# Patient Record
Sex: FEMALE | Race: WHITE
[De-identification: names, ages, dates, MRNs, and addresses within clinical notes are randomized per-mention and may not be internally consistent; named-entity substitution may affect disease eponyms.]

---

## 2020-09-02 ENCOUNTER — HOSPITAL ENCOUNTER (EMERGENCY)
Dept: HOSPITAL 41 - JD.ED | Age: 45
Discharge: HOME | End: 2020-09-02
Payer: COMMERCIAL

## 2020-09-02 VITALS — DIASTOLIC BLOOD PRESSURE: 95 MMHG | HEART RATE: 90 BPM | SYSTOLIC BLOOD PRESSURE: 162 MMHG

## 2020-09-02 DIAGNOSIS — Z79.899: ICD-10-CM

## 2020-09-02 DIAGNOSIS — F80.81: Primary | ICD-10-CM

## 2020-09-02 DIAGNOSIS — Z79.82: ICD-10-CM

## 2020-09-02 PROCEDURE — 36415 COLL VENOUS BLD VENIPUNCTURE: CPT

## 2020-09-02 PROCEDURE — 83735 ASSAY OF MAGNESIUM: CPT

## 2020-09-02 PROCEDURE — 85025 COMPLETE CBC W/AUTO DIFF WBC: CPT

## 2020-09-02 PROCEDURE — 96374 THER/PROPH/DIAG INJ IV PUSH: CPT

## 2020-09-02 PROCEDURE — 80053 COMPREHEN METABOLIC PANEL: CPT

## 2020-09-02 PROCEDURE — 99285 EMERGENCY DEPT VISIT HI MDM: CPT

## 2020-09-02 PROCEDURE — 70450 CT HEAD/BRAIN W/O DYE: CPT

## 2020-09-02 NOTE — CT
Head CT

 

Technique: Multiple axial sections through the brain were obtained.  

Intravenous contrast was not utilized.

 

Comparison: No prior intracranial imaging is available.

 

Findings: Ventricles along with basal cisterns and sulci over the 

convexities are within normal limits for the patient's age.  No 

abnormal parenchymal densities are seen.  No evidence of intracranial 

hemorrhage.  No midline shift or mass effect is seen.

 

Bone window settings were reviewed.  No acute calvarial finding is 

appreciated.  Visualized mastoid sinuses and visualized paranasal 

sinuses show nothing acute.

 

Impression:

1.  Nothing acute is appreciated on noncontrast head CT exam.

 

Diagnostic code #1

 

Study was dictated in MDT

## 2020-09-02 NOTE — EDM.PDOC
ED HPI GENERAL MEDICAL PROBLEM





- General


Chief Complaint: General


Stated Complaint: STUTTERING WORDS/ POSSIBLE FROM SURGERY ON MONDAY


Time Seen by Provider: 09/02/20 14:57





- History of Present Illness


INITIAL COMMENTS - FREE TEXT/NARRATIVE: 





45-year-old female presents the emergency room with stuttering.





This started yesterday.  On Monday the patient had a 3 level cervical fusion at 

C4-5 C5-6 and C 6-7.  She is wondering if there is a relationship.  Patient 

denies any fevers or chills she did have a little bit of a hoarse voice 

following the surgery which is to be expected.  Patient denies any other 

problems with this most unfortunate development.  Patient is a little anxious.


  ** Neck


Pain Score (Numeric/FACES): 10





- Related Data


                                    Allergies











Allergy/AdvReac Type Severity Reaction Status Date / Time


 


No Known Allergies Allergy   Verified 09/02/20 15:01











Home Meds: 


                                    Home Meds





ALPRAZolam [Alprazolam] 0.5 mg PO DAILY 06/26/15 [History]


Losartan [Cozaar] 50 mg PO DAILY 06/26/15 [History]


Venlafaxine HCl [Venlafaxine ER] 75 mg PO DAILY 06/26/15 [History]


Zolpidem [Ambien] 10 mg PO BEDTIME PRN 06/26/15 [History]


traZODone HCl [Trazodone HCl] 50 mg PO BEDTIME PRN 06/26/15 [History]


Acetaminophen/HYDROcodone [Norco 325-5 MG] 1 tab PO Q4H PRN #40 tablet 06/29/15 

[Rx]


Aspirin 325 mg PO DAILY #30 tablet 06/29/15 [Rx]











Past Medical History


Other OB/GYN History: HX OF PREGNANCY X4, 1 MISSED JUSTIN, 3 VAGINAL BIRTHS


Other Musculoskeletal History: RIGHT KNEE PAIN


Other Dermatologic History: BREAST AUGMENTATION





- Past Surgical History


Other HEENT Surgeries/Procedures: HX OF SINUS SURGERY AND OTOPLASTY





ED ROS GENERAL





- Review of Systems


Review Of Systems: See Below


Constitutional: Reports: No Symptoms


HEENT: Reports: Other (This change and stuttering)


Respiratory: Reports: No Symptoms


Cardiovascular: Reports: No Symptoms


Endocrine: Reports: No Symptoms


GI/Abdominal: Reports: No Symptoms


: Reports: No Symptoms


Musculoskeletal: Reports: Neck Pain (Chronic)


Skin: Reports: No Symptoms


Neurological: Reports: No Symptoms


Psychiatric: Reports: Anxiety.  Denies: No Symptoms


Hematologic/Lymphatic: Reports: No Symptoms


Immunologic: Reports: No Symptoms





ED EXAM, GENERAL





- Physical Exam


Exam: See Below


Exam Limited By: No Limitations


General Appearance: Alert, Anxious


Eye Exam: Bilateral Eye: Normal Inspection


Ears: Normal External Exam, Normal Canal, Hearing Grossly Normal, Normal TMs


Nose: Normal Inspection, Normal Mucosa, No Blood


Throat/Mouth: Normal Inspection, Normal Lips, Normal Gums, Normal Oropharynx, No

 Airway Compromise


Head: Atraumatic, Normocephalic


Neck: Other (Anterior approach for neck surgery incision looks good)


Respiratory/Chest: No Respiratory Distress, Lungs Clear, Normal Breath Sounds


Cardiovascular: Regular Rate, Rhythm, No Edema, No Murmur


GI/Abdominal: Normal Bowel Sounds, Soft, Non-Tender


Back Exam: Normal Inspection.  No: CVA Tenderness (L), CVA Tenderness (R)


Extremities: Normal Inspection, Normal Range of Motion





Course





- Vital Signs


Last Recorded V/S: 


                                Last Vital Signs











Temp  35.9 C L  09/02/20 14:57


 


Pulse  90   09/02/20 14:57


 


Resp  18   09/02/20 14:57


 


BP  162/95 H  09/02/20 14:57


 


Pulse Ox  98   09/02/20 14:57














- Orders/Labs/Meds


Labs: 


                                Laboratory Tests











  09/02/20 09/02/20 Range/Units





  15:40 15:40 


 


WBC  8.26   (3.98-10.04)  K/mm3


 


RBC  3.92 L   (3.98-5.22)  M/mm3


 


Hgb  12.8  D   (11.2-15.7)  gm/dl


 


Hct  39.0   (34.1-44.9)  %


 


MCV  99.5 H   (79.4-94.8)  fl


 


MCH  32.7 H   (25.6-32.2)  pg


 


MCHC  32.8   (32.2-35.5)  g/dl


 


RDW Std Deviation  48.4 H   (36.4-46.3)  fL


 


Plt Count  270  D   (182-369)  K/mm3


 


MPV  8.5 L   (9.4-12.3)  fl


 


Neut % (Auto)  78.0 H   (34.0-71.1)  %


 


Lymph % (Auto)  16.2 L   (19.3-51.7)  %


 


Mono % (Auto)  4.5 L   (4.7-12.5)  %


 


Eos % (Auto)  1.0   (0.7-5.8)  


 


Baso % (Auto)  0.1   (0.1-1.2)  %


 


Neut # (Auto)  6.44 H   (1.56-6.13)  K/mm3


 


Lymph # (Auto)  1.34   (1.18-3.74)  K/mm3


 


Mono # (Auto)  0.37 H   (0.24-0.36)  K/mm3


 


Eos # (Auto)  0.08   (0.04-0.36)  K/mm3


 


Baso # (Auto)  0.01   (0.01-0.08)  K/mm3


 


Sodium   137  (136-145)  mEq/L


 


Potassium   3.7  (3.5-5.1)  mEq/L


 


Chloride   100  ()  mEq/L


 


Carbon Dioxide   29  (21-32)  mEq/L


 


Anion Gap   11.7  (5-15)  


 


BUN   7  (7-18)  mg/dL


 


Creatinine   0.8  (0.55-1.02)  mg/dL


 


Est Cr Clr Drug Dosing   75.07  mL/min


 


Estimated GFR (MDRD)   > 60  (>60)  mL/min


 


BUN/Creatinine Ratio   8.8 L  (14-18)  


 


Glucose   105  ()  mg/dL


 


Calcium   9.6  (8.5-10.1)  mg/dL


 


Magnesium   1.8  (1.8-2.4)  mg/dl


 


Total Bilirubin   0.3  (0.2-1.0)  mg/dL


 


AST   18  (15-37)  U/L


 


ALT   14  (14-59)  U/L


 


Alkaline Phosphatase   53  ()  U/L


 


Total Protein   6.8  (6.4-8.2)  g/dl


 


Albumin   3.4  (3.4-5.0)  g/dl


 


Globulin   3.4  gm/dL


 


Albumin/Globulin Ratio   1.0  (1-2)  











Meds: 


Medications














Discontinued Medications














Generic Name Dose Route Start Last Admin





  Trade Name Freq  PRN Reason Stop Dose Admin


 


Lorazepam  1 mg  09/02/20 15:31  09/02/20 15:37





  Ativan  IVPUSH  09/02/20 15:32  1 mg





  ONETIME ONE   Administration














- Re-Assessments/Exams


Free Text/Narrative Re-Assessment/Exam: 





09/02/20 16:02


Stuttering is a central response not a peripheral response.  I did discuss the 

patient's case with Dr. Altman, the patient's surgeon,  who would like the 

patient to have a head CT and make sure electrolytes are okay which is 

reasonable.  If this is unrevealing we could schedule an outpatient MRI


09/02/20 16:34


Head CT is unremarkable labs as stated above unremarkable.  We will get the 

patient scheduled for an outpatient MRI with results to Dr. Altman and Dr. Chavez and interestingly patient speech seems to have improved after little bit 

of Ativan.





Departure





- Departure


Time of Disposition: 16:36


Disposition: Home, Self-Care 01


Clinical Impression: 


 Stuttering








- Discharge Information


Referrals: 


Guerrero Pompa MD [Primary Care Provider] - 


Forms:  ED Department Discharge


Additional Instructions: 


Follow-up with your regular doctor later this week for recheck.  Follow-up with 

your surgeon as scheduled.





Return to the emergency room with any emergent conditions.





You should be contacted by our x-ray department to get your brain MRI scheduled.

 Follow-up as directed with this.





Sepsis Event Note (ED)





- Focused Exam


Vital Signs: 


                                   Vital Signs











  Temp Pulse Resp BP Pulse Ox


 


 09/02/20 14:57  35.9 C L  90  18  162/95 H  98

## 2023-04-06 ENCOUNTER — HOSPITAL ENCOUNTER (OUTPATIENT)
Dept: HOSPITAL 41 - JD.SDS | Age: 48
Discharge: HOME | End: 2023-04-06
Attending: SPECIALIST
Payer: COMMERCIAL

## 2023-04-06 VITALS — SYSTOLIC BLOOD PRESSURE: 114 MMHG | DIASTOLIC BLOOD PRESSURE: 82 MMHG | HEART RATE: 73 BPM

## 2023-04-06 DIAGNOSIS — G62.9: ICD-10-CM

## 2023-04-06 DIAGNOSIS — J45.909: ICD-10-CM

## 2023-04-06 DIAGNOSIS — K44.9: ICD-10-CM

## 2023-04-06 DIAGNOSIS — F41.8: ICD-10-CM

## 2023-04-06 DIAGNOSIS — K20.90: Primary | ICD-10-CM

## 2023-04-06 DIAGNOSIS — M06.09: ICD-10-CM

## 2023-04-06 DIAGNOSIS — K31.A0: ICD-10-CM

## 2023-04-06 DIAGNOSIS — Z79.899: ICD-10-CM

## 2023-04-06 DIAGNOSIS — N39.0: ICD-10-CM

## 2023-04-06 DIAGNOSIS — Z98.1: ICD-10-CM

## 2023-04-06 DIAGNOSIS — G57.90: ICD-10-CM

## 2023-04-06 DIAGNOSIS — G89.29: ICD-10-CM

## 2023-04-06 DIAGNOSIS — F17.210: ICD-10-CM

## 2023-04-06 DIAGNOSIS — M54.50: ICD-10-CM

## 2023-04-06 DIAGNOSIS — Z98.890: ICD-10-CM

## 2023-04-06 DIAGNOSIS — I10: ICD-10-CM

## 2023-04-06 DIAGNOSIS — M19.90: ICD-10-CM

## 2023-04-06 PROCEDURE — 45380 COLONOSCOPY AND BIOPSY: CPT

## 2023-04-06 PROCEDURE — 43239 EGD BIOPSY SINGLE/MULTIPLE: CPT
